# Patient Record
Sex: OTHER/UNKNOWN | Race: WHITE | URBAN - METROPOLITAN AREA
[De-identification: names, ages, dates, MRNs, and addresses within clinical notes are randomized per-mention and may not be internally consistent; named-entity substitution may affect disease eponyms.]

---

## 2020-09-09 ENCOUNTER — OFFICE VISIT (OUTPATIENT)
Dept: URBAN - METROPOLITAN AREA CLINIC 71 | Facility: CLINIC | Age: 60
End: 2020-09-09

## 2020-09-09 DIAGNOSIS — H52.4 PRESBYOPIA: Primary | ICD-10-CM

## 2020-09-09 PROCEDURE — 92004 COMPRE OPH EXAM NEW PT 1/>: CPT | Performed by: OPTOMETRIST

## 2020-09-09 ASSESSMENT — VISUAL ACUITY
OS: 20/20
OD: 20/20

## 2020-09-09 ASSESSMENT — INTRAOCULAR PRESSURE
OD: 12
OS: 13

## 2020-09-09 ASSESSMENT — KERATOMETRY
OD: 44.88
OS: 44.88

## 2020-09-09 NOTE — IMPRESSION/PLAN
Impression: Presbyopia: H52.4. Plan: Presbyopia is the inability to focus on objects (ie: accommodate) due to the loss of flexibility of your natural lens. Presbyopia occurs with age. Reading glasses, bifocals, trifocals or contacts can be helpful. Contact the office if difficulty focusing persists despite corrective eye wear. New glasses rx given to patient today. Patient to call if there are any issues with new rx. Recommend patient to return annually for routine vision exams.